# Patient Record
Sex: MALE | Race: WHITE | Employment: STUDENT | URBAN - METROPOLITAN AREA
[De-identification: names, ages, dates, MRNs, and addresses within clinical notes are randomized per-mention and may not be internally consistent; named-entity substitution may affect disease eponyms.]

---

## 2017-12-22 ENCOUNTER — OFFICE VISIT (OUTPATIENT)
Dept: FAMILY MEDICINE CLINIC | Age: 22
End: 2017-12-22

## 2017-12-22 VITALS
HEIGHT: 73 IN | BODY MASS INDEX: 25.98 KG/M2 | TEMPERATURE: 97.6 F | HEART RATE: 72 BPM | RESPIRATION RATE: 16 BRPM | WEIGHT: 196 LBS | SYSTOLIC BLOOD PRESSURE: 112 MMHG | DIASTOLIC BLOOD PRESSURE: 70 MMHG

## 2017-12-22 DIAGNOSIS — M67.49 GANGLION AND CYST OF SYNOVIUM, TENDON AND BURSA: Primary | ICD-10-CM

## 2017-12-22 DIAGNOSIS — M67.89 GANGLION AND CYST OF SYNOVIUM, TENDON AND BURSA: Primary | ICD-10-CM

## 2017-12-22 DIAGNOSIS — M71.39 GANGLION AND CYST OF SYNOVIUM, TENDON AND BURSA: Primary | ICD-10-CM

## 2017-12-22 PROCEDURE — 1036F TOBACCO NON-USER: CPT | Performed by: FAMILY MEDICINE

## 2017-12-22 PROCEDURE — 99213 OFFICE O/P EST LOW 20 MIN: CPT | Performed by: FAMILY MEDICINE

## 2017-12-22 PROCEDURE — G8427 DOCREV CUR MEDS BY ELIG CLIN: HCPCS | Performed by: FAMILY MEDICINE

## 2017-12-22 PROCEDURE — G8484 FLU IMMUNIZE NO ADMIN: HCPCS | Performed by: FAMILY MEDICINE

## 2017-12-22 PROCEDURE — G8419 CALC BMI OUT NRM PARAM NOF/U: HCPCS | Performed by: FAMILY MEDICINE

## 2017-12-22 ASSESSMENT — PATIENT HEALTH QUESTIONNAIRE - PHQ9
SUM OF ALL RESPONSES TO PHQ9 QUESTIONS 1 & 2: 0
SUM OF ALL RESPONSES TO PHQ QUESTIONS 1-9: 0
1. LITTLE INTEREST OR PLEASURE IN DOING THINGS: 0
2. FEELING DOWN, DEPRESSED OR HOPELESS: 0

## 2017-12-22 ASSESSMENT — ENCOUNTER SYMPTOMS
NAUSEA: 0
COUGH: 0
ABDOMINAL PAIN: 0
SHORTNESS OF BREATH: 0
EYES NEGATIVE: 1

## 2017-12-22 NOTE — PROGRESS NOTES
Subjective  Erik Pratt, 25 y.o. male presents today with:  Chief Complaint   Patient presents with   Chyrl Montgomery Mass     Patient presents today for a mass on his upper right arm near the elbow x 7 months. Denies pain or drainage. Did not injure his arm. HPI    Patient presents today Complaining of a right arm lump that he noticed about 6 months ago. Works out regularly. Denies any injury. It is not changed. Eating okay. He goes to college full-time at [employer]    Overall doing well. Has no new problem / question. Health Maintenance Is Up-To-Date         No other questions and or concerns for today's visit      Review of Systems   Constitutional: Negative for activity change, appetite change, fatigue and fever. Eyes: Negative. Respiratory: Negative for cough and shortness of breath. Cardiovascular: Negative for chest pain and palpitations. Gastrointestinal: Negative for abdominal pain and nausea. Genitourinary: Negative for dysuria and frequency. Neurological: Negative for dizziness. History reviewed. No pertinent past medical history. History reviewed. No pertinent surgical history. Social History     Social History    Marital status: Single     Spouse name: N/A    Number of children: 0    Years of education: N/A     Occupational History     Full Time Student     Social History Main Topics    Smoking status: Never Smoker    Smokeless tobacco: Never Used      Comment: NON SMOKING HOUSEHOLD    Alcohol use No    Drug use: No    Sexual activity: No     Other Topics Concern    Not on file     Social History Narrative    No narrative on file     History reviewed. No pertinent family history. No Known Allergies  No current outpatient prescriptions on file.      Current Facility-Administered Medications   Medication Dose Route Frequency Provider Last Rate Last Dose    methylPREDNISolone acetate (DEPO-MEDROL) injection 80 mg  80 mg Intramuscular Once EnglishUp,

## 2019-04-15 ENCOUNTER — TELEPHONE (OUTPATIENT)
Dept: ADMINISTRATIVE | Age: 24
End: 2019-04-15